# Patient Record
Sex: FEMALE | Race: WHITE | Employment: OTHER | URBAN - METROPOLITAN AREA
[De-identification: names, ages, dates, MRNs, and addresses within clinical notes are randomized per-mention and may not be internally consistent; named-entity substitution may affect disease eponyms.]

---

## 2017-01-18 DIAGNOSIS — E03.8 OTHER SPECIFIED HYPOTHYROIDISM: Primary | ICD-10-CM

## 2017-01-18 RX ORDER — LEVOTHYROXINE SODIUM 0.05 MG/1
TABLET ORAL
Qty: 30 TABLET | Refills: 5 | Status: SHIPPED | OUTPATIENT
Start: 2017-01-18 | End: 2017-08-05

## 2017-01-23 DIAGNOSIS — I10 ESSENTIAL HYPERTENSION WITH GOAL BLOOD PRESSURE LESS THAN 130/80: Primary | ICD-10-CM

## 2017-01-23 RX ORDER — SERTRALINE HYDROCHLORIDE 100 MG/1
TABLET, FILM COATED ORAL
Qty: 15 TABLET | Refills: 0 | Status: SHIPPED | OUTPATIENT
Start: 2017-01-23 | End: 2017-02-19

## 2017-01-23 RX ORDER — LOSARTAN POTASSIUM 100 MG/1
TABLET ORAL
Qty: 30 TABLET | Refills: 0 | Status: SHIPPED | OUTPATIENT
Start: 2017-01-23 | End: 2017-02-19

## 2017-02-02 ENCOUNTER — TELEPHONE (OUTPATIENT)
Dept: INTERNAL MEDICINE CLINIC | Facility: CLINIC | Age: 82
End: 2017-02-02

## 2017-02-02 DIAGNOSIS — E03.8 OTHER SPECIFIED HYPOTHYROIDISM: ICD-10-CM

## 2017-02-02 DIAGNOSIS — I25.119 ATHEROSCLEROSIS OF CORONARY ARTERY WITH ANGINA PECTORIS, UNSPECIFIED VESSEL OR LESION TYPE, UNSPECIFIED WHETHER NATIVE OR TRANSPLANTED HEART (HCC): ICD-10-CM

## 2017-02-02 DIAGNOSIS — I10 ESSENTIAL HYPERTENSION WITH GOAL BLOOD PRESSURE LESS THAN 130/80: Primary | ICD-10-CM

## 2017-02-02 NOTE — TELEPHONE ENCOUNTER
Pt's Arjun Zhu, called to make an appt for the pt and for herself - Pt needs a check up and bloodwork, pt's daughter needs yearly physical. She said she can only do Saturday appts.  Dr. Lauri Coleman is book for 2/11, so I told her his next available w

## 2017-02-02 NOTE — TELEPHONE ENCOUNTER
[id number] Tricia Darby for the pt to be seen for a physical in March. Daughter is to call the office back to schedule. CBC, CMP, Lipid, UA and TSH orders entered for [employer].

## 2017-02-03 NOTE — TELEPHONE ENCOUNTER
LM for pt's daughter, Donny Nieto, at [phone], for her to c/b and schedule appt for pt and herself.

## 2017-02-06 DIAGNOSIS — Z86.73 HISTORY OF STROKE: Primary | ICD-10-CM

## 2017-02-06 RX ORDER — ASPIRIN 325 MG
TABLET, DELAYED RELEASE (ENTERIC COATED) ORAL
Qty: 30 TABLET | Refills: 5 | Status: SHIPPED | OUTPATIENT
Start: 2017-02-06 | End: 2017-08-05

## 2017-02-19 DIAGNOSIS — I10 ESSENTIAL HYPERTENSION WITH GOAL BLOOD PRESSURE LESS THAN 130/80: Primary | ICD-10-CM

## 2017-02-19 DIAGNOSIS — F34.1 DYSTHYMIA: ICD-10-CM

## 2017-02-20 PROBLEM — F34.1 DYSTHYMIA: Status: ACTIVE | Noted: 2017-02-20

## 2017-02-20 RX ORDER — LOSARTAN POTASSIUM 100 MG/1
TABLET ORAL
Qty: 30 TABLET | Refills: 0 | Status: SHIPPED | OUTPATIENT
Start: 2017-02-20 | End: 2017-03-29

## 2017-02-20 RX ORDER — SERTRALINE HYDROCHLORIDE 100 MG/1
TABLET, FILM COATED ORAL
Qty: 15 TABLET | Refills: 0 | Status: SHIPPED | OUTPATIENT
Start: 2017-02-20 | End: 2017-03-29

## 2017-03-07 DIAGNOSIS — R60.9 EDEMA, UNSPECIFIED TYPE: Primary | ICD-10-CM

## 2017-03-07 RX ORDER — BUMETANIDE 0.5 MG/1
TABLET ORAL
Qty: 15 TABLET | Refills: 0 | Status: SHIPPED | OUTPATIENT
Start: 2017-03-07 | End: 2017-06-13

## 2017-03-09 ENCOUNTER — OFFICE VISIT (OUTPATIENT)
Dept: INTERNAL MEDICINE CLINIC | Facility: CLINIC | Age: 82
End: 2017-03-09

## 2017-03-09 VITALS
TEMPERATURE: 98 F | DIASTOLIC BLOOD PRESSURE: 60 MMHG | HEIGHT: 60 IN | WEIGHT: 135 LBS | SYSTOLIC BLOOD PRESSURE: 100 MMHG | OXYGEN SATURATION: 98 % | RESPIRATION RATE: 16 BRPM | HEART RATE: 68 BPM | BODY MASS INDEX: 26.5 KG/M2

## 2017-03-09 DIAGNOSIS — Z13.89 SCREENING FOR GENITOURINARY CONDITION: ICD-10-CM

## 2017-03-09 DIAGNOSIS — Z13.0 SCREENING, ANEMIA, DEFICIENCY, IRON: ICD-10-CM

## 2017-03-09 DIAGNOSIS — Z13.29 THYROID DISORDER SCREEN: ICD-10-CM

## 2017-03-09 DIAGNOSIS — Z00.00 LABORATORY EXAMINATION ORDERED AS PART OF A ROUTINE GENERAL MEDICAL EXAMINATION: ICD-10-CM

## 2017-03-09 DIAGNOSIS — Z23 NEED FOR PNEUMOCOCCAL VACCINATION: ICD-10-CM

## 2017-03-09 DIAGNOSIS — Z00.00 PHYSICAL EXAM, ANNUAL: Primary | ICD-10-CM

## 2017-03-09 PROCEDURE — 99397 PER PM REEVAL EST PAT 65+ YR: CPT | Performed by: INTERNAL MEDICINE

## 2017-03-09 PROCEDURE — 90471 IMMUNIZATION ADMIN: CPT | Performed by: INTERNAL MEDICINE

## 2017-03-09 PROCEDURE — 90670 PCV13 VACCINE IM: CPT | Performed by: INTERNAL MEDICINE

## 2017-03-09 RX ORDER — ONDANSETRON 4 MG/1
4 TABLET, ORALLY DISINTEGRATING ORAL EVERY 4 HOURS PRN
COMMUNITY
End: 2019-01-01

## 2017-03-09 RX ORDER — GABAPENTIN 100 MG/1
200 CAPSULE ORAL DAILY
COMMUNITY
End: 2017-05-16 | Stop reason: DRUGHIGH

## 2017-03-09 RX ORDER — ASCORBIC ACID 500 MG
500 TABLET ORAL 2 TIMES DAILY
COMMUNITY
End: 2019-01-01

## 2017-03-09 RX ORDER — POLYETHYLENE GLYCOL 3350 17 G/17G
17 POWDER, FOR SOLUTION ORAL DAILY
COMMUNITY
End: 2017-09-09 | Stop reason: ALTCHOICE

## 2017-03-09 RX ORDER — MELATONIN
325
COMMUNITY
End: 2019-01-01

## 2017-03-09 RX ORDER — LIDOCAINE 50 MG/G
1 PATCH TOPICAL EVERY 24 HOURS
COMMUNITY
End: 2017-09-09 | Stop reason: ALTCHOICE

## 2017-03-09 RX ORDER — SENNOSIDES 8.6 MG
650 CAPSULE ORAL EVERY 4 HOURS PRN
COMMUNITY
End: 2019-01-01

## 2017-03-09 RX ORDER — ZOLPIDEM TARTRATE 5 MG/1
5 TABLET ORAL NIGHTLY PRN
COMMUNITY
End: 2019-01-01

## 2017-03-09 RX ORDER — NAPROXEN SODIUM 220 MG
TABLET ORAL 3 TIMES DAILY
COMMUNITY

## 2017-03-09 RX ORDER — FLUTICASONE PROPIONATE 50 MCG
1 SPRAY, SUSPENSION (ML) NASAL DAILY
COMMUNITY
End: 2017-09-09

## 2017-03-09 NOTE — PROGRESS NOTES
HPI:    Patient ID: Roberto Castaneda is a 80year old female.     HPI  HPI:   Roberto Castaneda is a 80year old female who presents for a complete physical exam. Symptoms: denies discharge, itching, burning or dysuria, is menopausal. Patient complains of left elb Propionate 50 MCG/ACT Nasal Suspension 1 spray by Each Nare route daily. Disp:  Rfl:    gabapentin 100 MG Oral Cap Take 200 mg by mouth daily. Disp:  Rfl:    Polyethylene Glycol 3350 Oral Powd Pack Take 17 g by mouth daily.  Disp:  Rfl:    lidocaine 5 % Ext INHALATION TWO TIMES A DAY RINSE MOUTH WITH WATER AFTER USE Disp: 61 each Rfl: 5   VOLTAREN 1 % Transdermal Gel APPLY TO KNEES 5 TIMES A DAY Disp: 100 g Rfl: 5   diazepam (VALIUM) 5 MG Oral Tab Take 1 tablet by mouth every 12 (twelve) hours as needed (musc Comment pacemaker placement, single-chamber      Family History   Problem Relation Age of Onset   • Breast Cancer Self    • Breast Cancer Maternal Cousin Female    • Breast Cancer Other      Neice      Social History:     Smoking Status: Never Smoker given for: exercise, low fat diet and breast self-exam. Body mass index is 26.37 kg/(m^2). , recommended low fat diet and aerobic exercise 30 minutes three times weekly. The patient indicates understanding of these issues and agrees to the plan.   The patie 5   FAMOTIDINE 20 MG Oral Tab TAKE ONE TABLET BY MOUTH EVERY DAY Disp: 30 tablet Rfl: 5   brimonidine Tartrate 0.2 % Ophthalmic Solution 1 drop both eyes twice a day Disp:  Rfl: 4   Carboxymethylcellulose Sodium 0.5 % Ophthalmic Solution 1 drop by Each eye disorder screen  Screening, anemia, deficiency, iron  Screening for genitourinary condition  Need for pneumococcal vaccination      Orders Placed This Encounter  CBC W/DIFF  COMP METABOLIC PANEL  HGB H4E  TSH W REFLEX TO FREE T4  URINALYSIS, ROUTINE  Pneum

## 2017-03-09 NOTE — PATIENT INSTRUCTIONS
Relieving Back Pain  Back pain is a common problem. You can strain back muscles by lifting too much weight or just by moving the wrong way. Back strain can be uncomfortable, even painful. And it can take weeks or months to improve.  To help yourself feel

## 2017-03-21 ENCOUNTER — OFFICE VISIT (OUTPATIENT)
Dept: INTERNAL MEDICINE CLINIC | Facility: CLINIC | Age: 82
End: 2017-03-21

## 2017-03-21 ENCOUNTER — HOSPITAL ENCOUNTER (OUTPATIENT)
Dept: GENERAL RADIOLOGY | Age: 82
Discharge: HOME | End: 2017-03-21
Attending: INTERNAL MEDICINE
Payer: MEDICAID

## 2017-03-21 VITALS
RESPIRATION RATE: 16 BRPM | TEMPERATURE: 99 F | DIASTOLIC BLOOD PRESSURE: 60 MMHG | WEIGHT: 135 LBS | HEIGHT: 60 IN | SYSTOLIC BLOOD PRESSURE: 100 MMHG | OXYGEN SATURATION: 90 % | HEART RATE: 82 BPM | BODY MASS INDEX: 26.5 KG/M2

## 2017-03-21 DIAGNOSIS — M25.512 ACUTE PAIN OF LEFT SHOULDER: Primary | ICD-10-CM

## 2017-03-21 DIAGNOSIS — M25.512 ACUTE PAIN OF LEFT SHOULDER: ICD-10-CM

## 2017-03-21 PROCEDURE — 73030 X-RAY EXAM OF SHOULDER: CPT

## 2017-03-21 PROCEDURE — 99214 OFFICE O/P EST MOD 30 MIN: CPT | Performed by: INTERNAL MEDICINE

## 2017-03-21 NOTE — PATIENT INSTRUCTIONS
Understanding Shoulder Instability  The shoulder is the most flexible joint in the body. It allows you to throw a ball, scratch your back, and reach in almost any direction. But if your shoulder joint is injured, it may become unstable.  This is called sh

## 2017-03-22 ENCOUNTER — TELEPHONE (OUTPATIENT)
Dept: INTERNAL MEDICINE CLINIC | Facility: CLINIC | Age: 82
End: 2017-03-22

## 2017-03-22 DIAGNOSIS — S42.292A OTHER CLOSED DISPLACED FRACTURE OF PROXIMAL END OF LEFT HUMERUS, INITIAL ENCOUNTER: Primary | ICD-10-CM

## 2017-03-22 NOTE — TELEPHONE ENCOUNTER
Patient daughter called back, and requested an order to be placed for Dr. Daquan Gentile, Orthopedic surgeon in [facility] and [address] [place] [address]. Ph# [phone] or [phone]. [place] [facility] sponsored by [facility].  I called Santa Nunez

## 2017-03-22 NOTE — TELEPHONE ENCOUNTER
Pt's daughter called, asked that we send orders in regard to pt's visit from yesterday to Dr. Kathleen Self phone #[phone].

## 2017-03-22 NOTE — TELEPHONE ENCOUNTER
I called Nadine at [phone], and spoke with Raciel Singer; he provided the following orthopedic surgeons that are on database accepting new [facility] with [facility]:  1. Dr. Lety Springer [address] [place] [address] ph# [phone]  2.  Dr. Murtaza Lee

## 2017-03-22 NOTE — TELEPHONE ENCOUNTER
Received call from [facility] ortho regarding pt's fracture. Informed her that this is a new fracture of left proximal humerus. Lyudmila Bolivar states their office no longer takes [facility] and to speak with Dallas Wan @ [phone] to verify this.       Spoke

## 2017-03-24 ENCOUNTER — PRIOR ORIGINAL RECORDS (OUTPATIENT)
Dept: OTHER | Age: 82
End: 2017-03-24

## 2017-03-29 NOTE — TELEPHONE ENCOUNTER
Rx refill sen tot retail per Dr. John Lovelace. Pt is not due to be seen in the office until 9/2017.

## 2017-04-10 ENCOUNTER — TELEPHONE (OUTPATIENT)
Dept: INTERNAL MEDICINE CLINIC | Facility: CLINIC | Age: 82
End: 2017-04-10

## 2017-04-10 RX ORDER — SERTRALINE HYDROCHLORIDE 100 MG/1
TABLET, FILM COATED ORAL
Qty: 15 TABLET | Refills: 0 | OUTPATIENT
Start: 2017-04-10

## 2017-04-10 RX ORDER — LOSARTAN POTASSIUM 100 MG/1
TABLET ORAL
Qty: 30 TABLET | Refills: 0 | OUTPATIENT
Start: 2017-04-10

## 2017-04-17 ENCOUNTER — MED REC SCAN ONLY (OUTPATIENT)
Dept: INTERNAL MEDICINE CLINIC | Facility: CLINIC | Age: 82
End: 2017-04-17

## 2017-05-16 ENCOUNTER — TELEPHONE (OUTPATIENT)
Dept: INTERNAL MEDICINE CLINIC | Facility: CLINIC | Age: 82
End: 2017-05-16

## 2017-05-16 DIAGNOSIS — G89.29 CHRONIC BACK PAIN GREATER THAN 3 MONTHS DURATION: Primary | ICD-10-CM

## 2017-05-16 DIAGNOSIS — J44.9 CHRONIC OBSTRUCTIVE PULMONARY DISEASE, UNSPECIFIED COPD TYPE (HCC): ICD-10-CM

## 2017-05-16 DIAGNOSIS — M54.9 CHRONIC BACK PAIN GREATER THAN 3 MONTHS DURATION: Primary | ICD-10-CM

## 2017-05-16 RX ORDER — GABAPENTIN 300 MG/1
300 CAPSULE ORAL 3 TIMES DAILY
Qty: 90 CAPSULE | Refills: 5 | Status: SHIPPED | OUTPATIENT
Start: 2017-05-16 | End: 2017-10-26

## 2017-05-16 RX ORDER — GABAPENTIN 400 MG/1
400 CAPSULE ORAL EVERY MORNING
Qty: 30 CAPSULE | Refills: 5 | Status: SHIPPED | OUTPATIENT
Start: 2017-05-16 | End: 2017-10-06

## 2017-05-16 RX ORDER — FLUTICASONE PROPIONATE AND SALMETEROL 50; 100 UG/1; UG/1
POWDER RESPIRATORY (INHALATION)
Qty: 60 EACH | Refills: 4 | Status: SHIPPED | OUTPATIENT
Start: 2017-05-16 | End: 2017-10-03

## 2017-05-16 NOTE — TELEPHONE ENCOUNTER
Spoke with daughter pt is taking Gabapentin 400mg early in the morning and then 300mg TID for pain management. Pt needs refill. Also needs refill on Ventolin.       Sent to Dr. Janice Cortes for approval.

## 2017-05-16 NOTE — TELEPHONE ENCOUNTER
Triston Potter called back and states patient is using Advair for her COPD. PA initiated through cover my meds.    Key: [id number]    If Sue Baez has not replied to your request within 24 hours please contact Sue Baez at [phone]

## 2017-05-16 NOTE — TELEPHONE ENCOUNTER
Fax request from [place] for [facility] changing the RX or submitting a PA for Advair Diskus 100-50 MCG/DOSE aerosol. Paper in triage.

## 2017-05-17 NOTE — TELEPHONE ENCOUNTER
Advair 100/50 has been approved for 12 months. Tracking ID: [id number]    The pharmacy was notified of this.

## 2017-05-30 DIAGNOSIS — I10 ESSENTIAL HYPERTENSION WITH GOAL BLOOD PRESSURE LESS THAN 130/80: Primary | ICD-10-CM

## 2017-05-31 RX ORDER — AMLODIPINE BESYLATE 10 MG/1
TABLET ORAL
Qty: 30 TABLET | Refills: 4 | Status: SHIPPED | OUTPATIENT
Start: 2017-05-31 | End: 2017-11-02

## 2017-06-12 RX ORDER — FAMOTIDINE 20 MG/1
TABLET ORAL
Qty: 30 TABLET | Refills: 4 | Status: SHIPPED | OUTPATIENT
Start: 2017-06-12 | End: 2017-11-02

## 2017-06-13 DIAGNOSIS — R60.9 EDEMA, UNSPECIFIED TYPE: Primary | ICD-10-CM

## 2017-06-14 RX ORDER — BUMETANIDE 0.5 MG/1
TABLET ORAL
Qty: 15 TABLET | Refills: 0 | Status: SHIPPED | OUTPATIENT
Start: 2017-06-14

## 2017-06-22 DIAGNOSIS — M25.562 CHRONIC PAIN OF BOTH KNEES: Primary | ICD-10-CM

## 2017-06-22 DIAGNOSIS — G89.29 CHRONIC PAIN OF BOTH KNEES: Primary | ICD-10-CM

## 2017-06-22 DIAGNOSIS — M25.561 CHRONIC PAIN OF BOTH KNEES: Primary | ICD-10-CM

## 2017-06-23 ENCOUNTER — TELEPHONE (OUTPATIENT)
Dept: INTERNAL MEDICINE CLINIC | Facility: CLINIC | Age: 82
End: 2017-06-23

## 2017-06-23 NOTE — TELEPHONE ENCOUNTER
PA request from [address] [place] [address] for DICLOFENAC SODIUM 1 % Transdermal Gel - form in triage.

## 2017-06-26 NOTE — TELEPHONE ENCOUNTER
Diclofenac Gel 1% #300gms/30 days has been APPROVED for 6 months. Tracking ID# [id number]  ID# [id number].      Pharmacy notified and they confirmed approval

## 2017-08-05 DIAGNOSIS — E03.8 OTHER SPECIFIED HYPOTHYROIDISM: ICD-10-CM

## 2017-08-05 DIAGNOSIS — Z86.73 HISTORY OF STROKE: ICD-10-CM

## 2017-08-07 ENCOUNTER — PATIENT MESSAGE (OUTPATIENT)
Dept: INTERNAL MEDICINE CLINIC | Facility: CLINIC | Age: 82
End: 2017-08-07

## 2017-08-07 DIAGNOSIS — Z00.00 LABORATORY EXAMINATION ORDERED AS PART OF A ROUTINE GENERAL MEDICAL EXAMINATION: Primary | ICD-10-CM

## 2017-08-07 RX ORDER — LEVOTHYROXINE SODIUM 0.05 MG/1
TABLET ORAL
Qty: 30 TABLET | Refills: 4 | Status: SHIPPED | OUTPATIENT
Start: 2017-08-07 | End: 2017-12-28

## 2017-08-07 RX ORDER — ASPIRIN 325 MG
TABLET, DELAYED RELEASE (ENTERIC COATED) ORAL
Qty: 30 TABLET | Refills: 4 | Status: SHIPPED | OUTPATIENT
Start: 2017-08-07 | End: 2017-12-28

## 2017-08-07 NOTE — TELEPHONE ENCOUNTER
From: Ricardo Henderson  To: Deonte Álvarez MD  Sent: 8/7/2017 3:44 PM CDT  Subject: Non-Urgent Medical Question    good afternoon    for Mom Lbs do i have to go to [facility]    as always   thank you   Julio Cesar

## 2017-08-08 ENCOUNTER — PATIENT MESSAGE (OUTPATIENT)
Dept: INTERNAL MEDICINE CLINIC | Facility: CLINIC | Age: 82
End: 2017-08-08

## 2017-08-08 NOTE — TELEPHONE ENCOUNTER
From: Celeste Salgado  To: Cheryl Shea MD  Sent: 8/8/2017 3:27 PM CDT  Subject: Non-Urgent Medical Question    would you do me a big favor and send me a copy of the test request to me to have it handy. sometimes [facility] doesn't have everything ready.      th

## 2017-08-13 LAB
ABSOLUTE BASOPHILS: 37 CELLS/UL (ref 0–200)
ABSOLUTE EOSINOPHILS: 130 CELLS/UL (ref 15–500)
ABSOLUTE LYMPHOCYTES: 1513 CELLS/UL (ref 850–3900)
ABSOLUTE MONOCYTES: 614 CELLS/UL (ref 200–950)
ABSOLUTE NEUTROPHILS: 3906 CELLS/UL (ref 1500–7800)
ALBUMIN/GLOBULIN RATIO: 1.5 (CALC) (ref 1–2.5)
ALBUMIN: 4.4 G/DL (ref 3.6–5.1)
ALKALINE PHOSPHATASE: 93 U/L (ref 33–130)
ALT: 6 U/L (ref 6–29)
AST: 13 U/L (ref 10–35)
BASOPHILS: 0.6 %
BILIRUBIN, TOTAL: 0.7 MG/DL (ref 0.2–1.2)
BILIRUBIN: NEGATIVE
BUN/CREATININE RATIO: 23 (CALC) (ref 6–22)
BUN: 22 MG/DL (ref 7–25)
CALCIUM: 9.6 MG/DL (ref 8.6–10.4)
CARBON DIOXIDE: 31 MMOL/L (ref 20–31)
CHLORIDE: 97 MMOL/L (ref 98–110)
COLOR: YELLOW
CREATININE: 0.97 MG/DL (ref 0.6–0.88)
EGFR IF AFRICN AM: 59 ML/MIN/1.73M2
EGFR IF NONAFRICN AM: 51 ML/MIN/1.73M2
EOSINOPHILS: 2.1 %
GLOBULIN: 2.9 G/DL (CALC) (ref 1.9–3.7)
GLUCOSE: 100 MG/DL (ref 65–99)
GLUCOSE: NEGATIVE
HEMATOCRIT: 37.6 % (ref 35–45)
HEMOGLOBIN A1C: 5.3 % OF TOTAL HGB
HEMOGLOBIN: 12.8 G/DL (ref 11.7–15.5)
KETONES: NEGATIVE
LEUKOCYTE ESTERASE: NEGATIVE
LYMPHOCYTES: 24.4 %
MCH: 30.9 PG (ref 27–33)
MCHC: 34 G/DL (ref 32–36)
MCV: 90.8 FL (ref 80–100)
MONOCYTES: 9.9 %
MPV: 10.6 FL (ref 7.5–12.5)
NEUTROPHILS: 63 %
NITRITE: NEGATIVE
OCCULT BLOOD: NEGATIVE
PLATELET COUNT: 232 THOUSAND/UL (ref 140–400)
POTASSIUM: 4.6 MMOL/L (ref 3.5–5.3)
PROTEIN, TOTAL: 7.3 G/DL (ref 6.1–8.1)
PROTEIN: NEGATIVE
RDW: 12.6 % (ref 11–15)
RED BLOOD CELL COUNT: 4.14 MILLION/UL (ref 3.8–5.1)
SODIUM: 138 MMOL/L (ref 135–146)
SPECIFIC GRAVITY: 1.01 (ref 1–1.03)
TSH: 1.9 MIU/L (ref 0.4–4.5)
WHITE BLOOD CELL COUNT: 6.2 THOUSAND/UL (ref 3.8–10.8)

## 2017-09-09 ENCOUNTER — OFFICE VISIT (OUTPATIENT)
Dept: INTERNAL MEDICINE CLINIC | Facility: CLINIC | Age: 82
End: 2017-09-09

## 2017-09-09 VITALS
RESPIRATION RATE: 16 BRPM | OXYGEN SATURATION: 93 % | DIASTOLIC BLOOD PRESSURE: 78 MMHG | HEART RATE: 67 BPM | TEMPERATURE: 98 F | SYSTOLIC BLOOD PRESSURE: 130 MMHG | HEIGHT: 60 IN

## 2017-09-09 DIAGNOSIS — M25.561 CHRONIC PAIN OF BOTH KNEES: ICD-10-CM

## 2017-09-09 DIAGNOSIS — M25.562 CHRONIC PAIN OF BOTH KNEES: ICD-10-CM

## 2017-09-09 DIAGNOSIS — G89.29 CHRONIC PAIN OF BOTH KNEES: ICD-10-CM

## 2017-09-09 DIAGNOSIS — Z23 NEED FOR INFLUENZA VACCINATION: ICD-10-CM

## 2017-09-09 DIAGNOSIS — I10 ESSENTIAL HYPERTENSION WITH GOAL BLOOD PRESSURE LESS THAN 130/80: Primary | ICD-10-CM

## 2017-09-09 DIAGNOSIS — J43.1 PANLOBULAR EMPHYSEMA (HCC): ICD-10-CM

## 2017-09-09 PROCEDURE — 90653 IIV ADJUVANT VACCINE IM: CPT | Performed by: INTERNAL MEDICINE

## 2017-09-09 PROCEDURE — 90471 IMMUNIZATION ADMIN: CPT | Performed by: INTERNAL MEDICINE

## 2017-09-09 PROCEDURE — 99214 OFFICE O/P EST MOD 30 MIN: CPT | Performed by: INTERNAL MEDICINE

## 2017-09-09 RX ORDER — BRIMONIDINE TARTRATE 2 MG/ML
2 SOLUTION/ DROPS OPHTHALMIC 2 TIMES DAILY
Qty: 1 BOTTLE | Refills: 6 | Status: SHIPPED | OUTPATIENT
Start: 2017-09-09 | End: 2017-12-26

## 2017-09-09 RX ORDER — FLUTICASONE PROPIONATE 50 MCG
1 SPRAY, SUSPENSION (ML) NASAL DAILY
Qty: 3 BOTTLE | Refills: 1 | Status: SHIPPED | OUTPATIENT
Start: 2017-09-09 | End: 2019-01-01

## 2017-09-09 NOTE — PATIENT INSTRUCTIONS
Chronic Pain  Pain serves an important role. It lets you know something is wrong that needs your attention. When the body heals, pain normally goes away. When pain lasts longer than six months, it is called “chronic” pain.  This is pain that is present e · Changing certain habits can help reduce chronic pain.  They include:  ¨ Eating healthy  ¨ Developing an exercise routine  ¨ Getting enough sleep at night  ¨ Stopping smoking and limiting alcohol use  ¨ Losing excess weight  Follow-up care  Follow up with

## 2017-09-10 NOTE — PROGRESS NOTES
HPI:    Patient ID: Manan Brady is a 80year old female.     HPI  Here for follow up  Tolerating meds  Reports no wheeze or sob assoc with copd  Denies cp or sob  Still with b/l knee pain but not worsened, using volatren cream      Review of Systems   Con 90 capsule Rfl: 5   gabapentin 400 MG Oral Cap Take 1 capsule (400 mg total) by mouth every morning.  Disp: 30 capsule Rfl: 5   VENTOLIN  (90 Base) MCG/ACT Inhalation Aero Soln Inhale 2 puffs into the lungs every 6 (six) hours as needed for Wheezing due to OA- cont voltaren gel qid    Orders Placed This Encounter      Fluad 0.5 ml  65 years and older (13588)    Meds This Visit:  Signed Prescriptions Disp Refills    Diclofenac Sodium 1 % Transdermal Gel 100 g 2      Sig: APPLY TO KNEES 5 TIMES A DAY

## 2017-09-22 ENCOUNTER — PRIOR ORIGINAL RECORDS (OUTPATIENT)
Dept: OTHER | Age: 82
End: 2017-09-22

## 2017-10-03 DIAGNOSIS — J43.1 PANLOBULAR EMPHYSEMA (HCC): Primary | ICD-10-CM

## 2017-10-03 DIAGNOSIS — I10 ESSENTIAL HYPERTENSION WITH GOAL BLOOD PRESSURE LESS THAN 130/80: ICD-10-CM

## 2017-10-03 DIAGNOSIS — F34.1 DYSTHYMIA: ICD-10-CM

## 2017-10-03 RX ORDER — FLUTICASONE PROPIONATE AND SALMETEROL 50; 100 UG/1; UG/1
POWDER RESPIRATORY (INHALATION)
Qty: 60 EACH | Refills: 3 | Status: SHIPPED | OUTPATIENT
Start: 2017-10-03 | End: 2018-01-26

## 2017-10-03 RX ORDER — LOSARTAN POTASSIUM 100 MG/1
TABLET ORAL
Qty: 30 TABLET | Refills: 5 | Status: SHIPPED | OUTPATIENT
Start: 2017-10-03 | End: 2018-03-23

## 2017-10-03 RX ORDER — SERTRALINE HYDROCHLORIDE 100 MG/1
TABLET, FILM COATED ORAL
Qty: 15 TABLET | Refills: 5 | Status: SHIPPED | OUTPATIENT
Start: 2017-10-03 | End: 2018-03-24

## 2017-10-06 ENCOUNTER — PATIENT MESSAGE (OUTPATIENT)
Dept: INTERNAL MEDICINE CLINIC | Facility: CLINIC | Age: 82
End: 2017-10-06

## 2017-10-06 DIAGNOSIS — G89.29 CHRONIC BACK PAIN GREATER THAN 3 MONTHS DURATION: ICD-10-CM

## 2017-10-06 DIAGNOSIS — M54.9 CHRONIC BACK PAIN GREATER THAN 3 MONTHS DURATION: ICD-10-CM

## 2017-10-06 RX ORDER — HYDROCODONE BITARTRATE AND ACETAMINOPHEN 5; 325 MG/1; MG/1
1 TABLET ORAL EVERY 6 HOURS PRN
Qty: 120 TABLET | Refills: 0 | Status: SHIPPED | OUTPATIENT
Start: 2017-10-06

## 2017-10-06 RX ORDER — TIZANIDINE HYDROCHLORIDE 2 MG/1
2 CAPSULE, GELATIN COATED ORAL NIGHTLY PRN
Qty: 30 CAPSULE | Refills: 0 | Status: SHIPPED | OUTPATIENT
Start: 2017-10-06 | End: 2017-11-02

## 2017-10-06 RX ORDER — GABAPENTIN 400 MG/1
400 CAPSULE ORAL EVERY MORNING
Qty: 30 CAPSULE | Refills: 2 | Status: SHIPPED | OUTPATIENT
Start: 2017-10-06 | End: 2017-10-26

## 2017-10-06 NOTE — TELEPHONE ENCOUNTER
From: Rina Marti  To: Elisa Wayne MD  Sent: 10/6/2017 2:10 PM CDT  Subject: Other    good afternoon    Niki(Mom) has lots of pain on her lower back left side , exactly the same thing she had last year around June and went for rehab and we worked on

## 2017-10-06 NOTE — TELEPHONE ENCOUNTER
From: Henrry Chavarria  To: Dillon Ferguson MD  Sent: 10/6/2017 2:48 PM CDT  Subject: Other    just want to remind that you have all the medication and setup from last year david Darby would like to see if before he make a decision.     thanks

## 2017-10-06 NOTE — TELEPHONE ENCOUNTER
Dr. Hossein Mcdonald recommends Tizanidine to help with muscle spasms once nightly as needed. Norco 5/325, 1 tablet every 6-8 hours as needed for pain. Gabapentin can be adjusted to 400mg in the AM and PM and 300mg in the afternoon.  Gabapentin should not be taken mo

## 2017-10-10 ENCOUNTER — PRIOR ORIGINAL RECORDS (OUTPATIENT)
Dept: OTHER | Age: 82
End: 2017-10-10

## 2017-10-25 ENCOUNTER — PATIENT MESSAGE (OUTPATIENT)
Dept: INTERNAL MEDICINE CLINIC | Facility: CLINIC | Age: 82
End: 2017-10-25

## 2017-10-25 DIAGNOSIS — M54.9 CHRONIC BACK PAIN GREATER THAN 3 MONTHS DURATION: ICD-10-CM

## 2017-10-25 DIAGNOSIS — G89.29 CHRONIC BACK PAIN GREATER THAN 3 MONTHS DURATION: ICD-10-CM

## 2017-10-26 RX ORDER — GABAPENTIN 400 MG/1
CAPSULE ORAL
Qty: 60 CAPSULE | Refills: 2 | Status: SHIPPED | OUTPATIENT
Start: 2017-10-26 | End: 2018-03-30

## 2017-10-26 RX ORDER — GABAPENTIN 300 MG/1
CAPSULE ORAL
Qty: 90 CAPSULE | Refills: 5 | COMMUNITY
Start: 2017-10-26 | End: 2017-11-02

## 2017-10-26 NOTE — TELEPHONE ENCOUNTER
From: Sada Chavez  To: Quique Smith MD  Sent: 10/25/2017 4:40 PM CDT  Subject: Non-Urgent Medical Question    Good afternoon   it is me and my mother problem again. i have few things i need to pass by you .   1- the pain is getting better by the [facility]

## 2017-11-02 DIAGNOSIS — M54.9 CHRONIC BACK PAIN GREATER THAN 3 MONTHS DURATION: ICD-10-CM

## 2017-11-02 DIAGNOSIS — I10 ESSENTIAL HYPERTENSION WITH GOAL BLOOD PRESSURE LESS THAN 130/80: ICD-10-CM

## 2017-11-02 DIAGNOSIS — G89.29 CHRONIC BACK PAIN GREATER THAN 3 MONTHS DURATION: ICD-10-CM

## 2017-11-02 RX ORDER — TIZANIDINE 2 MG/1
TABLET ORAL
Qty: 30 TABLET | Refills: 0 | Status: SHIPPED | OUTPATIENT
Start: 2017-11-02 | End: 2019-01-01

## 2017-11-02 RX ORDER — GABAPENTIN 300 MG/1
CAPSULE ORAL
Qty: 30 CAPSULE | Refills: 2 | Status: SHIPPED | OUTPATIENT
Start: 2017-11-02 | End: 2018-03-30

## 2017-11-02 RX ORDER — AMLODIPINE BESYLATE 10 MG/1
TABLET ORAL
Qty: 30 TABLET | Refills: 5 | Status: SHIPPED | OUTPATIENT
Start: 2017-11-02 | End: 2018-01-01

## 2017-11-02 RX ORDER — FAMOTIDINE 20 MG/1
TABLET ORAL
Qty: 30 TABLET | Refills: 5 | Status: SHIPPED | OUTPATIENT
Start: 2017-11-02 | End: 2018-02-19

## 2017-11-02 RX ORDER — GABAPENTIN 300 MG/1
CAPSULE ORAL
Qty: 90 CAPSULE | Refills: 4 | OUTPATIENT
Start: 2017-11-02

## 2017-11-02 NOTE — TELEPHONE ENCOUNTER
Amlodipine and Famotidine was sent to retail per protocol.      Tizanidine and Gabapentin was routed for approval.

## 2017-12-28 DIAGNOSIS — E03.8 OTHER SPECIFIED HYPOTHYROIDISM: ICD-10-CM

## 2017-12-28 DIAGNOSIS — Z86.73 HISTORY OF STROKE: ICD-10-CM

## 2017-12-29 RX ORDER — LEVOTHYROXINE SODIUM 0.05 MG/1
TABLET ORAL
Qty: 30 TABLET | Refills: 3 | Status: SHIPPED | OUTPATIENT
Start: 2017-12-29 | End: 2018-01-01

## 2017-12-29 RX ORDER — ASPIRIN 325 MG
TABLET, DELAYED RELEASE (ENTERIC COATED) ORAL
Qty: 30 TABLET | Refills: 3 | Status: SHIPPED | OUTPATIENT
Start: 2017-12-29 | End: 2018-01-01

## 2017-12-31 ENCOUNTER — PRIOR ORIGINAL RECORDS (OUTPATIENT)
Dept: OTHER | Age: 82
End: 2017-12-31

## 2018-01-01 ENCOUNTER — TELEPHONE (OUTPATIENT)
Dept: INTERNAL MEDICINE CLINIC | Facility: CLINIC | Age: 83
End: 2018-01-01

## 2018-01-01 DIAGNOSIS — M54.9 CHRONIC BACK PAIN GREATER THAN 3 MONTHS DURATION: ICD-10-CM

## 2018-01-01 DIAGNOSIS — J43.1 PANLOBULAR EMPHYSEMA (HCC): ICD-10-CM

## 2018-01-01 DIAGNOSIS — I10 ESSENTIAL HYPERTENSION WITH GOAL BLOOD PRESSURE LESS THAN 130/80: ICD-10-CM

## 2018-01-01 DIAGNOSIS — Z86.73 HISTORY OF STROKE: ICD-10-CM

## 2018-01-01 DIAGNOSIS — E03.8 OTHER SPECIFIED HYPOTHYROIDISM: ICD-10-CM

## 2018-01-01 DIAGNOSIS — G89.29 CHRONIC BACK PAIN GREATER THAN 3 MONTHS DURATION: ICD-10-CM

## 2018-01-01 RX ORDER — GABAPENTIN 400 MG/1
CAPSULE ORAL
Qty: 60 CAPSULE | Refills: 1 | Status: SHIPPED | OUTPATIENT
Start: 2018-01-01 | End: 2019-01-01

## 2018-01-01 RX ORDER — GABAPENTIN 300 MG/1
CAPSULE ORAL
Qty: 30 CAPSULE | Refills: 2 | Status: SHIPPED | OUTPATIENT
Start: 2018-01-01 | End: 2019-01-01

## 2018-01-01 RX ORDER — BRIMONIDINE TARTRATE 2 MG/ML
SOLUTION/ DROPS OPHTHALMIC
Qty: 5 ML | Refills: 4 | Status: SHIPPED | OUTPATIENT
Start: 2018-01-01 | End: 2019-01-01

## 2018-01-01 RX ORDER — LEVOTHYROXINE SODIUM 0.05 MG/1
TABLET ORAL
Qty: 30 TABLET | Refills: 5 | Status: SHIPPED | OUTPATIENT
Start: 2018-01-01 | End: 2019-01-01

## 2018-01-01 RX ORDER — FLUTICASONE PROPIONATE AND SALMETEROL 50; 100 UG/1; UG/1
POWDER RESPIRATORY (INHALATION)
Qty: 60 EACH | Refills: 1 | Status: SHIPPED | OUTPATIENT
Start: 2018-01-01 | End: 2019-01-01

## 2018-01-01 RX ORDER — AMLODIPINE BESYLATE 10 MG/1
TABLET ORAL
Qty: 30 TABLET | Refills: 5 | Status: SHIPPED | OUTPATIENT
Start: 2018-01-01 | End: 2019-01-01

## 2018-01-01 RX ORDER — ERYTHROMYCIN 5 MG/G
OINTMENT OPHTHALMIC
Qty: 3.5 G | Refills: 0 | Status: SHIPPED | OUTPATIENT
Start: 2018-01-01 | End: 2019-01-01

## 2018-01-01 RX ORDER — ASPIRIN 325 MG
TABLET, DELAYED RELEASE (ENTERIC COATED) ORAL
Qty: 30 TABLET | Refills: 5 | Status: SHIPPED | OUTPATIENT
Start: 2018-01-01 | End: 2019-01-01

## 2018-01-02 ENCOUNTER — TELEPHONE (OUTPATIENT)
Dept: INTERNAL MEDICINE CLINIC | Facility: CLINIC | Age: 83
End: 2018-01-02

## 2018-01-02 NOTE — TELEPHONE ENCOUNTER
PA issued for Tirosint but pt is taking generic levothyroxine. Spoke with pharmacy this was an error on their end and switched it to generic levothyroxine. No PA is needed.

## 2018-01-26 DIAGNOSIS — J43.1 PANLOBULAR EMPHYSEMA (HCC): ICD-10-CM

## 2018-01-26 RX ORDER — FLUTICASONE PROPIONATE AND SALMETEROL 50; 100 UG/1; UG/1
POWDER RESPIRATORY (INHALATION)
Qty: 60 EACH | Refills: 2 | Status: SHIPPED | OUTPATIENT
Start: 2018-01-26 | End: 2018-01-01

## 2018-02-19 ENCOUNTER — APPOINTMENT (OUTPATIENT)
Dept: CT IMAGING | Facility: HOSPITAL | Age: 83
End: 2018-02-19
Attending: NURSE PRACTITIONER
Payer: COMMERCIAL

## 2018-02-19 ENCOUNTER — HOSPITAL ENCOUNTER (EMERGENCY)
Facility: HOSPITAL | Age: 83
Discharge: HOME | End: 2018-02-19
Attending: EMERGENCY MEDICINE
Payer: COMMERCIAL

## 2018-02-19 ENCOUNTER — APPOINTMENT (OUTPATIENT)
Dept: GENERAL RADIOLOGY | Facility: HOSPITAL | Age: 83
End: 2018-02-19
Attending: NURSE PRACTITIONER
Payer: COMMERCIAL

## 2018-02-19 VITALS
HEART RATE: 86 BPM | HEIGHT: 55 IN | DIASTOLIC BLOOD PRESSURE: 73 MMHG | BODY MASS INDEX: 33.56 KG/M2 | TEMPERATURE: 98 F | SYSTOLIC BLOOD PRESSURE: 132 MMHG | WEIGHT: 145 LBS | OXYGEN SATURATION: 95 % | RESPIRATION RATE: 19 BRPM
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
## 2018-02-19 DIAGNOSIS — S09.90XA MINOR HEAD INJURY, INITIAL ENCOUNTER: ICD-10-CM

## 2018-02-19 PROCEDURE — 99284 EMERGENCY DEPT VISIT MOD MDM: CPT

## 2018-02-19 PROCEDURE — 73130 X-RAY EXAM OF HAND: CPT | Performed by: NURSE PRACTITIONER

## 2018-02-19 PROCEDURE — 70450 CT HEAD/BRAIN W/O DYE: CPT | Performed by: NURSE PRACTITIONER

## 2018-02-19 PROCEDURE — 72125 CT NECK SPINE W/O DYE: CPT | Performed by: NURSE PRACTITIONER

## 2018-02-19 RX ORDER — FAMOTIDINE 20 MG/1
TABLET ORAL
Qty: 30 TABLET | Refills: 3 | Status: SHIPPED | OUTPATIENT
Start: 2018-02-19 | End: 2019-01-01

## 2018-02-19 NOTE — ED INITIAL ASSESSMENT (HPI)
Pt to ED post fall out of wheelchair 30 mins pta. Large hematoma noted to head. Denies LOC. Denies N/V. Pt c/o headache. Pt A/Ox2, per daughter this is pt's norm.

## 2018-02-20 NOTE — ED NOTES
Pt assisted to wheelchair to use  The restroom prior to discharge, gauze dressing applied to forehead per family request.

## 2018-02-20 NOTE — ED PROVIDER NOTES
Patient Seen in: [facility] Emergency Department    History   Patient presents with:  Contusion (musculoskeletal)    Stated Complaint: fall hi    77-year-old female presents today with history of fall from wheelchair.   States sons were trying to get KNEE REPLACEMENT SURGERY Right  No date: MASTECTOMY LEFT  No date: MASTECTOMY,SIMPLE      Comment: left  2003: OTHER SURGICAL HISTORY      Comment: Aortic valve replacement  2005: OTHER SURGICAL HISTORY      Comment: Coronary bypass  No date: RADIATION LEF display    ED Course as of Feb 19 2011  ------------------------------------------------------------  Xr Hand (min 3 Views), Left (cpt=73130)    Result Date: 2/19/2018  PROCEDURE:  XR HAND (MIN 3 VIEWS), LEFT (CPT=73130)  TECHNIQUE:  Three views were obtai sinusitis. MASTOIDS:          No sign of acute inflammation. SKULL:             Soft tissue hematoma involving the left frontal scalp. No evidence for fracture or osseous abnormality. OTHER:             None. CONCLUSION:  1.  No acute intracranial pr primary MD in 3-5 days if symptoms not improved. Return to ER with any dizziness, change in vision, change in behavior, or confusion. Take Tylenol for pain. Keep wound clean watch for signs for infection.   Wounds were irrigated clean and antibiotic oint

## 2018-02-27 ENCOUNTER — OFFICE VISIT (OUTPATIENT)
Dept: INTERNAL MEDICINE CLINIC | Facility: CLINIC | Age: 83
End: 2018-02-27

## 2018-02-27 VITALS
RESPIRATION RATE: 16 BRPM | SYSTOLIC BLOOD PRESSURE: 122 MMHG | TEMPERATURE: 98 F | HEART RATE: 74 BPM | DIASTOLIC BLOOD PRESSURE: 62 MMHG

## 2018-02-27 DIAGNOSIS — S00.83XD CONTUSION OF FACE, SUBSEQUENT ENCOUNTER: ICD-10-CM
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
## 2018-02-27 DIAGNOSIS — S60.222D CONTUSION OF LEFT HAND, SUBSEQUENT ENCOUNTER: ICD-10-CM

## 2018-02-27 DIAGNOSIS — L30.9 DERMATITIS: ICD-10-CM

## 2018-02-27 PROCEDURE — 99212 OFFICE O/P EST SF 10 MIN: CPT | Performed by: INTERNAL MEDICINE

## 2018-02-27 RX ORDER — ERYTHROMYCIN 5 MG/G
1 OINTMENT OPHTHALMIC AS NEEDED
COMMUNITY
End: 2018-02-27

## 2018-02-27 RX ORDER — ERYTHROMYCIN 5 MG/G
1 OINTMENT OPHTHALMIC NIGHTLY
Qty: 1 TUBE | Refills: 1 | Status: SHIPPED | OUTPATIENT
Start: 2018-02-27 | End: 2018-01-01

## 2018-02-27 NOTE — PROGRESS NOTES
HPI:    Patient ID: Sada Chavez is a 80year old female. Fall   Incident onset: yesterday. Fall occurred: from sitting. She landed on concrete. The point of impact was the face. The pain is present in the left hand. The pain is at a severity of 5/10. gabapentin 300 MG Oral Cap Take 1 tablet by mouth daily in the afternoon. To be taken daily with the 400mg. Disp: 30 capsule Rfl: 2   gabapentin 400 MG Oral Cap Take 1 tablet by mouth in the morning and nightly.  Disp: 60 capsule Rfl: 2   HYDROcodone-acetam Anastrozole (ARIMIDEX) 1 MG Oral Tab Take 1 mg by mouth daily. Disp:  Rfl:      Allergies:  Fentanyl                Itching   PHYSICAL EXAM:   Physical Exam   Constitutional: She appears well-developed and well-nourished. No distress.    HENT:   Head:

## 2018-02-27 NOTE — PATIENT INSTRUCTIONS
Soft Tissue Contusion  You have a contusion. This is also called a bruise. There is swelling and some bleeding under the skin.  This injury generally takes a few days to a few weeks to heal.  During that time, the bruise will typically change in color fro © 1467-5674 [employer] [address]. [address] [facility] [place]. All rights reserved. This information is not intended as a substitute for professional medical care. Always follow your healthcare professional's instructions.         Bruises Apply an ice pack or bag of frozen peas to a bruise. Keep a thin cloth between the ice or frozen peas and your skin. The cold can help reduce redness and swelling. Date Last Reviewed: 12/1/2016  © 9864-8882 [employer].  [address] [facility]

## 2018-03-19 ENCOUNTER — TELEPHONE (OUTPATIENT)
Dept: INTERNAL MEDICINE CLINIC | Facility: CLINIC | Age: 83
End: 2018-03-19

## 2018-03-20 NOTE — TELEPHONE ENCOUNTER
Form completed for home supplies due to incontinence, given to Dr. Veronica Barr to sign and then faxed.

## 2018-03-22 ENCOUNTER — TELEPHONE (OUTPATIENT)
Dept: INTERNAL MEDICINE CLINIC | Facility: CLINIC | Age: 83
End: 2018-03-22

## 2018-03-23 DIAGNOSIS — I10 ESSENTIAL HYPERTENSION WITH GOAL BLOOD PRESSURE LESS THAN 130/80: ICD-10-CM

## 2018-03-23 RX ORDER — LOSARTAN POTASSIUM 100 MG/1
TABLET ORAL
Qty: 30 TABLET | Refills: 4 | Status: SHIPPED | OUTPATIENT
Start: 2018-03-23 | End: 2019-01-01

## 2018-03-24 DIAGNOSIS — F34.1 DYSTHYMIA: ICD-10-CM

## 2018-03-26 RX ORDER — SERTRALINE HYDROCHLORIDE 100 MG/1
TABLET, FILM COATED ORAL
Qty: 15 TABLET | Refills: 0 | Status: SHIPPED | OUTPATIENT
Start: 2018-03-26 | End: 2019-01-01

## 2018-03-27 ENCOUNTER — MED REC SCAN ONLY (OUTPATIENT)
Dept: INTERNAL MEDICINE CLINIC | Facility: CLINIC | Age: 83
End: 2018-03-27

## 2018-03-30 DIAGNOSIS — G89.29 CHRONIC BACK PAIN GREATER THAN 3 MONTHS DURATION: ICD-10-CM

## 2018-03-30 DIAGNOSIS — M54.9 CHRONIC BACK PAIN GREATER THAN 3 MONTHS DURATION: ICD-10-CM

## 2018-04-02 RX ORDER — GABAPENTIN 300 MG/1
CAPSULE ORAL
Qty: 30 CAPSULE | Refills: 2 | Status: SHIPPED | OUTPATIENT
Start: 2018-04-02 | End: 2018-01-01

## 2018-04-02 RX ORDER — GABAPENTIN 400 MG/1
CAPSULE ORAL
Qty: 60 CAPSULE | Refills: 2 | Status: SHIPPED | OUTPATIENT
Start: 2018-04-02 | End: 2018-01-01

## 2018-04-12 ENCOUNTER — LAB ENCOUNTER (OUTPATIENT)
Dept: LAB | Facility: HOSPITAL | Age: 83
End: 2018-04-12
Attending: INTERNAL MEDICINE
Payer: COMMERCIAL

## 2018-04-12 DIAGNOSIS — I49.5 SINOATRIAL NODE DYSFUNCTION (HCC): ICD-10-CM

## 2018-04-12 DIAGNOSIS — I44.2 COMPLETE HEART BLOCK (HCC): Primary | ICD-10-CM

## 2018-04-12 DIAGNOSIS — Z95.4 S/P ROSS PROCEDURE: ICD-10-CM

## 2018-04-12 PROCEDURE — 83880 ASSAY OF NATRIURETIC PEPTIDE: CPT

## 2018-04-12 PROCEDURE — 80048 BASIC METABOLIC PNL TOTAL CA: CPT

## 2018-04-12 PROCEDURE — 36415 COLL VENOUS BLD VENIPUNCTURE: CPT

## 2018-12-31 ENCOUNTER — PRIOR ORIGINAL RECORDS (OUTPATIENT)
Dept: OTHER | Age: 83
End: 2018-12-31

## 2019-01-01 ENCOUNTER — TELEPHONE (OUTPATIENT)
Dept: INTERNAL MEDICINE CLINIC | Facility: CLINIC | Age: 84
End: 2019-01-01

## 2019-03-20 NOTE — TELEPHONE ENCOUNTER
How are you, I need your help again. My mother is not doing very well; she has been going back and forth from rehab to hospital   every two weeks.  Starting last memorial weekend.  They have send her to hospice for few month and now they want to release her

## 2019-03-20 NOTE — TELEPHONE ENCOUNTER
Daughter sent email see note. Ok per Dr. Florin Aceves for FMLA. Need updated information on hospital visits/hospice and such. Spoke with [facility] with [facility] and [place] Dyer is their service area.       Spoke with daughter pt has been in a Hospic

## 2019-03-30 ENCOUNTER — HOSPITAL (OUTPATIENT)
Dept: OTHER | Age: 84
End: 2019-03-30

## 2019-03-30 LAB
A/G RATIO_: 0.9
ABS LYMPH: 2.4 K/CUMM (ref 1–3.5)
ABS MONO: 0.7 K/CUMM (ref 0.1–0.8)
ABS NEUTRO: 10 K/CUMM (ref 2–8)
ALBUMIN: 3.1 G/DL (ref 3.5–5)
ALK PHOS: 87 UNIT/L (ref 50–124)
ALT/GPT: 6 UNIT/L (ref 0–55)
ANION GAP SERPL CALC-SCNC: 15 MEQ/L (ref 10–20)
AST/GOT: 15 UNIT/L (ref 5–34)
BASOPHIL: 0 % (ref 0–1)
BILI TOTAL: 0.9 MG/DL (ref 0.2–1)
BNP: 519 PG/ML (ref 1–100)
BUN SERPL-MCNC: 32 MG/DL (ref 6–20)
CALCIUM: 9.5 MG/DL (ref 8.4–10.2)
CHLORIDE: 93 MEQ/L (ref 97–107)
CREATININE: 1.34 MG/DL (ref 0.6–1.3)
DIFF_TYPE?: ABNORMAL
EOSINOPHIL: 2 % (ref 0–6)
GLOBULIN_: 3.4 G/DL (ref 2–4.1)
GLUCOSE LVL: 114 MG/DL (ref 70–99)
HCT VFR BLD CALC: 32 % (ref 33–45)
HEMOLYSIS 2+: NEGATIVE
HEMOLYSIS 2+: NEGATIVE
HEMOLYSIS 4+: NEGATIVE
HGB BLD-MCNC: 10.3 G/DL (ref 11–15)
IMMATURE GRAN: 0.4 % (ref 0–0.3)
INSTR WBC: 13.5 K/CUMM (ref 4–11)
LIPEMIC 3+: NEGATIVE
LYMPHOCYTE: 18 %
MAGNESIUM LEVEL: 1.9 MG/DL (ref 1.6–2.6)
MCH RBC QN AUTO: 32 PG (ref 25–35)
MCHC RBC AUTO-ENTMCNC: 32 G/DL (ref 32–37)
MCV RBC AUTO: 98 FL (ref 78–97)
MONOCYTE: 5 %
NEUTROPHIL: 74 %
NRBC BLD MANUAL-RTO: 0 % (ref 0–0.2)
PLATELET: 205 K/CUMM (ref 150–450)
PLT ESTIMATE: ADEQUATE
POTASSIUM: 3.6 MEQ/L (ref 3.5–5.1)
RBC # BLD: 3.25 M/CUMM (ref 3.7–5.2)
RBC MORPH: NORMAL
RDW: 14.8 % (ref 11.5–14.5)
SODIUM: 143 MEQ/L (ref 136–145)
TCO2: 39 MEQ/L (ref 19–29)
TOTAL PROTEIN: 6.5 G/DL (ref 6.4–8.3)
WBC # BLD: 13.5 K/CUMM (ref 4–11)

## 2020-10-09 LAB
ALBUMIN/GLOB SERPL: 1.5 (CALC) (ref 1–2.5)
ALBUMIN: 3.9 G/DL (ref 3.6–5.1)
ALKALINE PHOSPHATASE: 80 UNIT/L (ref 33–130)
ALT: 7 UNIT/L (ref 6–29)
AST: 12 UNIT/L (ref 10–35)
BASO%: 0.5 %
BASO: 0 10^3/UL
BILIRUBIN, TOTAL: 0.5 MG/DL (ref 0.2–1.2)
BUN/CREATININE RATIO: 24 (CALC) (ref 6–22)
CALCIUM: 9.4 MG/DL (ref 8.6–10.4)
CARBON DIOXIDE: 27 MMOL/L (ref 20–31)
CHLORIDE: 100 MMOL/L (ref 98–110)
CRCL (C&G) (MOSAIQ HL): 30.02 ML/MIN
CREATININE CLEARANCE (MOSAIQ HL): 32.2 ML/MIN
CREATININE: 1.13 MG/DL (ref 0.6–0.88)
EGFR AFRICAN AMERICAN: 49 ML/MIN/1.73M2
EGFR NON-AFR. AMERICAN: 42 ML/MIN/1.73M2
EOS%: 4.5 %
EOS: 0.3 10^3/UL
GLOBULIN: 2.6 G/DL (CALC) (ref 1.9–3.7)
GLUCOSE: 112 MG/DL (ref 65–99)
HCT: 35 % (ref 38–54)
HGB: 11.9 G/DL (ref 12–18)
LYMPH%: 22.9 % (ref 12–44)
LYMPH: 1.4 10^3/UL (ref 0.8–2.8)
MCH: 31.2 PG (ref 26–33)
MCHC: 34 G/DL (ref 31–36)
MCV: 91.9 FML (ref 82–100)
MONO%: 14.3 % (ref 2–12)
MONO: 0.9 10^3/UL (ref 0.2–1)
MPV: 9.1 FML (ref 8.6–11.7)
NEUT%: 57.8 % (ref 47–76)
NEUT: 3.5 10^3/UL (ref 1.5–7.1)
PLT: 211 10^3/UL (ref 150–375)
POTASSIUM: 4.1 MMOL/L (ref 3.5–5.3)
PROTEIN, TOTAL: 6.5 G/DL (ref 6.1–8.1)
RBC: 3.81 10^6/UL (ref 4.2–6.2)
RDW-CV: 13.4 %
RDW-SD: 43.5 FML (ref 36–50)
SODIUM: 137 MMOL/L (ref 135–146)
UREA NITROGEN (BUN): 27 MG/DL (ref 7–25)
WBC: 6 10^3/UL (ref 4.3–11)

## 2020-10-11 VITALS — DIASTOLIC BLOOD PRESSURE: 68 MMHG | SYSTOLIC BLOOD PRESSURE: 124 MMHG

## 2020-10-11 VITALS
SYSTOLIC BLOOD PRESSURE: 104 MMHG | BODY MASS INDEX: 27.59 KG/M2 | WEIGHT: 131.99 LBS | DIASTOLIC BLOOD PRESSURE: 70 MMHG

## 2025-03-03 NOTE — PROGRESS NOTES
HPI:    Patient ID: Yaz Hernandez is a 80year old female. Shoulder Pain   The pain is present in the left shoulder. This is a new problem. The current episode started yesterday.  There has been a history of trauma (pt was falling and son caught her at l ARTHRITIS PAIN) 650 MG Oral Tab CR Take 650 mg by mouth every 4 (four) hours as needed for Pain. Disp:  Rfl:    Vitamin C 500 MG Oral Tab Take 500 mg by mouth 2 (two) times daily.  Disp:  Rfl:    ondansetron 4 MG Oral Tablet Dispersible Take 4 mg by mouth e daily. Disp: 90 tablet Rfl: 1   Docusate Sodium (STOOL SOFTENER) 100 MG Oral Tab Take 100 mg by mouth 2 (two) times daily. Disp:  Rfl:    Anastrozole (ARIMIDEX) 1 MG Oral Tab Take 1 mg by mouth daily.  Disp:  Rfl:    Calcium Carbonate (CALCIUM 500 OR) Take Report taken from RICHIE Chaudhry

## (undated) NOTE — MR AVS SNAPSHOT
[address]  [address] [address] [phone]               Thank you for choosing us for your health care visit with Cecilia Yost MD.  We are glad to serve you and happy to provide you with this nuñez Back pain is a common problem. You can strain back muscles by lifting too much weight or just by moving the wrong way. Back strain can be uncomfortable, even painful. And it can take weeks or months to improve.  To help yourself feel better and prevent futu This list is accurate as of: 3/9/17 11:51 AM.  Always use your most recent med list.                ADVAIR DISKUS 100-50 MCG/DOSE Aepb   Generic drug:  fluticasone-salmeterol   USE 1 INHALATION TWO TIMES A DAY RINSE MOUTH WITH WATER AFTER USE           MILES Commonly known as:  SYNTHROID           lidocaine 5 % Ptch   Place 1 patch onto the skin daily.    Commonly known as:  LIDODERM           losartan 100 MG Tabs   TAKE ONE TABLET BY MOUTH EVERY DAY   Commonly known as:  COZAAR           Mometasone Furoate 50 Summaries. If you've been to the Emergency Department or your doctor's office, you can view your past visit information in [email or web address] by going to Visits < Visit Summaries. [email or web address] questions? Call [phone] for help.   [email or web address] is NOT to be used for urge

## (undated) NOTE — MR AVS SNAPSHOT
[address]  [address] [address] [phone]               Thank you for choosing us for your health care visit with Bethanie Pérez MD.  We are glad to serve you and happy to provide you with this nuñez The head of the arm bone (humerus) rests in a socket (glenoid), much like a golf ball fits on a alisha. Parts of the joint called stabilizers hold the humeral head and glenoid together.  These include a sheet of ligaments and other tough fibers called the caps Generic drug:  Naproxen Sodium   Take by mouth 3 (three) times daily. AmLODIPine Besylate 10 MG Tabs   Take 1 tablet by mouth daily.    Commonly known as:  NORVASC           ARIMIDEX 1 MG Tabs tab   Generic drug:  anastrozole   Take 1 mg by mouth Take 4 mg by mouth every 4 (four) hours as needed for Nausea. Commonly known as:  ZOFRAN-ODT           Polyethylene Glycol 3350 Pack   Take 17 g by mouth daily.    Commonly known as:  MIRALAX           Sertraline HCl 100 MG Tabs   TAKE 0.5 TABLETS BY MOUT

## (undated) NOTE — ED AVS SNAPSHOT
Manan Brady   MRN: [id number]    Department:  [facility] Emergency Department   Date of Visit:  2/19/2018           Disclosure     Insurance plans vary and the physician(s) referred by the ER may not be covered by your plan.  Please contact your tell this physician (or your personal doctor if your instructions are to return to your personal doctor) about any new or lasting problems. The primary care or specialist physician will see patients referred from the [facility] Emergency Department.  Linette Caba